# Patient Record
Sex: FEMALE | Race: WHITE | NOT HISPANIC OR LATINO | URBAN - METROPOLITAN AREA
[De-identification: names, ages, dates, MRNs, and addresses within clinical notes are randomized per-mention and may not be internally consistent; named-entity substitution may affect disease eponyms.]

---

## 2018-03-22 ENCOUNTER — INPATIENT (INPATIENT)
Facility: HOSPITAL | Age: 38
LOS: 0 days | Discharge: HOME | DRG: 419 | End: 2018-03-23
Attending: SURGERY | Admitting: SURGERY
Payer: COMMERCIAL

## 2018-03-22 VITALS
HEART RATE: 72 BPM | RESPIRATION RATE: 18 BRPM | OXYGEN SATURATION: 99 % | TEMPERATURE: 98 F | SYSTOLIC BLOOD PRESSURE: 105 MMHG | DIASTOLIC BLOOD PRESSURE: 73 MMHG | WEIGHT: 184.97 LBS

## 2018-03-22 DIAGNOSIS — Z98.891 HISTORY OF UTERINE SCAR FROM PREVIOUS SURGERY: Chronic | ICD-10-CM

## 2018-03-22 LAB
ALBUMIN SERPL ELPH-MCNC: 4.5 G/DL — SIGNIFICANT CHANGE UP (ref 3.3–5)
ALP SERPL-CCNC: 242 U/L — HIGH (ref 40–120)
ALT FLD-CCNC: 198 U/L — HIGH (ref 10–45)
ANION GAP SERPL CALC-SCNC: 13 MMOL/L — SIGNIFICANT CHANGE UP (ref 5–17)
APPEARANCE UR: (no result)
AST SERPL-CCNC: 137 U/L — HIGH (ref 10–40)
BACTERIA # UR AUTO: (no result) /HPF
BASOPHILS NFR BLD AUTO: 0.2 % — SIGNIFICANT CHANGE UP (ref 0–2)
BILIRUB SERPL-MCNC: 0.7 MG/DL — SIGNIFICANT CHANGE UP (ref 0.2–1.2)
BILIRUB UR-MCNC: NEGATIVE — SIGNIFICANT CHANGE UP
BUN SERPL-MCNC: 8 MG/DL — SIGNIFICANT CHANGE UP (ref 7–23)
CALCIUM SERPL-MCNC: 9.2 MG/DL — SIGNIFICANT CHANGE UP (ref 8.4–10.5)
CHLORIDE SERPL-SCNC: 98 MMOL/L — SIGNIFICANT CHANGE UP (ref 96–108)
CO2 SERPL-SCNC: 26 MMOL/L — SIGNIFICANT CHANGE UP (ref 22–31)
COLOR SPEC: YELLOW — SIGNIFICANT CHANGE UP
COMMENT - URINE: SIGNIFICANT CHANGE UP
CREAT SERPL-MCNC: 0.83 MG/DL — SIGNIFICANT CHANGE UP (ref 0.5–1.3)
DIFF PNL FLD: (no result)
EOSINOPHIL NFR BLD AUTO: 2.3 % — SIGNIFICANT CHANGE UP (ref 0–6)
EPI CELLS # UR: (no result) /HPF (ref 0–5)
GLUCOSE SERPL-MCNC: 79 MG/DL — SIGNIFICANT CHANGE UP (ref 70–99)
GLUCOSE UR QL: NEGATIVE — SIGNIFICANT CHANGE UP
HCG SERPL-ACNC: <.1 MIU/ML — SIGNIFICANT CHANGE UP
HCT VFR BLD CALC: 41.5 % — SIGNIFICANT CHANGE UP (ref 34.5–45)
HGB BLD-MCNC: 13.3 G/DL — SIGNIFICANT CHANGE UP (ref 11.5–15.5)
KETONES UR-MCNC: 15 MG/DL
LEUKOCYTE ESTERASE UR-ACNC: (no result)
LIDOCAIN IGE QN: 469 U/L — HIGH (ref 7–60)
LYMPHOCYTES # BLD AUTO: 41.5 % — SIGNIFICANT CHANGE UP (ref 13–44)
MCHC RBC-ENTMCNC: 27.9 PG — SIGNIFICANT CHANGE UP (ref 27–34)
MCHC RBC-ENTMCNC: 32 G/DL — SIGNIFICANT CHANGE UP (ref 32–36)
MCV RBC AUTO: 87 FL — SIGNIFICANT CHANGE UP (ref 80–100)
MONOCYTES NFR BLD AUTO: 3.6 % — SIGNIFICANT CHANGE UP (ref 2–14)
NEUTROPHILS NFR BLD AUTO: 52.4 % — SIGNIFICANT CHANGE UP (ref 43–77)
NITRITE UR-MCNC: POSITIVE
PH UR: 6 — SIGNIFICANT CHANGE UP (ref 5–8)
PLATELET # BLD AUTO: 266 K/UL — SIGNIFICANT CHANGE UP (ref 150–400)
POTASSIUM SERPL-MCNC: 3.7 MMOL/L — SIGNIFICANT CHANGE UP (ref 3.5–5.3)
POTASSIUM SERPL-SCNC: 3.7 MMOL/L — SIGNIFICANT CHANGE UP (ref 3.5–5.3)
PROT SERPL-MCNC: 8.1 G/DL — SIGNIFICANT CHANGE UP (ref 6–8.3)
PROT UR-MCNC: NEGATIVE MG/DL — SIGNIFICANT CHANGE UP
RBC # BLD: 4.77 M/UL — SIGNIFICANT CHANGE UP (ref 3.8–5.2)
RBC # FLD: 13 % — SIGNIFICANT CHANGE UP (ref 10.3–16.9)
RBC CASTS # UR COMP ASSIST: < 5 /HPF — SIGNIFICANT CHANGE UP
SODIUM SERPL-SCNC: 137 MMOL/L — SIGNIFICANT CHANGE UP (ref 135–145)
SP GR SPEC: 1.01 — SIGNIFICANT CHANGE UP (ref 1–1.03)
UROBILINOGEN FLD QL: 0.2 E.U./DL — SIGNIFICANT CHANGE UP
WBC # BLD: 6 K/UL — SIGNIFICANT CHANGE UP (ref 3.8–10.5)
WBC # FLD AUTO: 6 K/UL — SIGNIFICANT CHANGE UP (ref 3.8–10.5)
WBC UR QL: (no result) /HPF

## 2018-03-22 PROCEDURE — 99285 EMERGENCY DEPT VISIT HI MDM: CPT

## 2018-03-22 PROCEDURE — 71045 X-RAY EXAM CHEST 1 VIEW: CPT | Mod: 26

## 2018-03-22 PROCEDURE — 76700 US EXAM ABDOM COMPLETE: CPT | Mod: 26

## 2018-03-22 RX ORDER — ONDANSETRON 8 MG/1
4 TABLET, FILM COATED ORAL EVERY 6 HOURS
Qty: 0 | Refills: 0 | Status: DISCONTINUED | OUTPATIENT
Start: 2018-03-22 | End: 2018-03-22

## 2018-03-22 RX ORDER — SODIUM CHLORIDE 9 MG/ML
1000 INJECTION, SOLUTION INTRAVENOUS
Qty: 0 | Refills: 0 | Status: DISCONTINUED | OUTPATIENT
Start: 2018-03-22 | End: 2018-03-23

## 2018-03-22 RX ORDER — HYDROMORPHONE HYDROCHLORIDE 2 MG/ML
0.5 INJECTION INTRAMUSCULAR; INTRAVENOUS; SUBCUTANEOUS EVERY 4 HOURS
Qty: 0 | Refills: 0 | Status: DISCONTINUED | OUTPATIENT
Start: 2018-03-22 | End: 2018-03-23

## 2018-03-22 RX ORDER — ONDANSETRON 8 MG/1
4 TABLET, FILM COATED ORAL EVERY 6 HOURS
Qty: 0 | Refills: 0 | Status: DISCONTINUED | OUTPATIENT
Start: 2018-03-22 | End: 2018-03-23

## 2018-03-22 RX ORDER — HEPARIN SODIUM 5000 [USP'U]/ML
5000 INJECTION INTRAVENOUS; SUBCUTANEOUS EVERY 8 HOURS
Qty: 0 | Refills: 0 | Status: DISCONTINUED | OUTPATIENT
Start: 2018-03-22 | End: 2018-03-23

## 2018-03-22 RX ORDER — CEFOXITIN 1 G/1
2 INJECTION, POWDER, FOR SOLUTION INTRAVENOUS EVERY 6 HOURS
Qty: 0 | Refills: 0 | Status: DISCONTINUED | OUTPATIENT
Start: 2018-03-23 | End: 2018-03-23

## 2018-03-22 RX ORDER — CEFOXITIN 1 G/1
2 INJECTION, POWDER, FOR SOLUTION INTRAVENOUS ONCE
Qty: 0 | Refills: 0 | Status: COMPLETED | OUTPATIENT
Start: 2018-03-22 | End: 2018-03-22

## 2018-03-22 RX ORDER — NORGESTIMATE AND ETHINYL ESTRADIOL 7DAYSX3 LO
1 KIT ORAL DAILY
Qty: 0 | Refills: 0 | Status: DISCONTINUED | OUTPATIENT
Start: 2018-03-22 | End: 2018-03-23

## 2018-03-22 RX ORDER — CEFOXITIN 1 G/1
INJECTION, POWDER, FOR SOLUTION INTRAVENOUS
Qty: 0 | Refills: 0 | Status: DISCONTINUED | OUTPATIENT
Start: 2018-03-22 | End: 2018-03-23

## 2018-03-22 RX ADMIN — CEFOXITIN 100 GRAM(S): 1 INJECTION, POWDER, FOR SOLUTION INTRAVENOUS at 19:17

## 2018-03-22 RX ADMIN — HEPARIN SODIUM 5000 UNIT(S): 5000 INJECTION INTRAVENOUS; SUBCUTANEOUS at 21:48

## 2018-03-22 RX ADMIN — SODIUM CHLORIDE 100 MILLILITER(S): 9 INJECTION, SOLUTION INTRAVENOUS at 19:22

## 2018-03-22 NOTE — H&P ADULT - NSHPLABSRESULTS_GEN_ALL_CORE
13.3   6.0   )-----------( 266      ( 22 Mar 2018 13:13 )             41.5   03-22    137  |  98  |  8   ----------------------------<  79  3.7   |  26  |  0.83    Ca    9.2      22 Mar 2018 13:13    TPro  8.1  /  Alb  4.5  /  TBili  0.7  /  DBili  x   /  AST  137<H>  /  ALT  198<H>  /  AlkPhos  242<H>  03-22        EXAM:  US ABDOMEN COMPLETE                          PROCEDURE DATE:  03/22/2018                     INTERPRETATION:  ABDOMINAL ULTRASOUND dated 3/22/2018 4:10 PM    INDICATION: Nausea. Upper abdominal pain and vomiting for five days.    PRIOR STUDIES: 0.5    FINDINGS:     Liver: Normal echogenicity. Mildly enlarged. No focal lesions.    Intrahepatic ducts: Not dilated.    Common bile duct: Normal diameter, measuring 0.5 cm.    Gallbladder: The gallbladder is filled with stones. No gallbladder wall   thickening is noted. The patient was not tender over the gallbladder.    Pancreas: The visualized portions are normal in appearance.      Spleen: No focal abnormality seen. Length of 12.4 cm.    Abdominal aorta: No abdominal aortic aneurysm is seen.    Inferior vena cava: The visualized portions are normal in appearance.    Right kidney: No hydronephrosis. Normal echogenicity. No focal lesions.   Length of 10.6 cm.    Left kidney: No hydronephrosis. Normal echogenicity. No focal lesions.   Length of 10.4 cm.    Ascites: None.    Also noted: No other abnormalities are noted.      IMPRESSION:  1.  Cholelithiasis. No gallbladder wall thickening. Negative sonographic   Solano sign.  2.  Mild hepatomegaly.              "Thank you for the opportunity to participate in the care of this   patient."        ODALYS DUMONT M.D., ATTENDING RADIOLOGIST  This document has been electronically signed. Mar 22 2018  4:28PM

## 2018-03-22 NOTE — ED PROVIDER NOTE - ATTENDING CONTRIBUTION TO CARE
37 F hx GERD p/w 5 days intermittent epigastric pain, worse with food, intermittent nausea vomiting.  Currently w/o pain. Nontender.  Labs with elevated LFTs as outpt.  DDx include gallstone pancreatitis, gallstones, choledocholithiasis.  Plan labs, us, and surgery consult.

## 2018-03-22 NOTE — ED ADULT NURSE NOTE - OBJECTIVE STATEMENT
c.o epigastric pain on saturday. hx of gerd. states her "liver enzymes were elevated yesterday and was sent by pmd". denies any pain, nausea vomiting at this time. denies any other medical complaints

## 2018-03-22 NOTE — ED PROVIDER NOTE - OBJECTIVE STATEMENT
36 yo F with pmh of GERD c/o intermittent epigastric pain x 5 days. Pt states pain is burning/pressure and worse after she eats. Occasional nausea and vomiting. Pt reports similar pain on and off for a few years and was told it was GERD. Pt has altered her diet and avoids spicy/greasy foods. Pt saw her pmd yesterday and her LFTs were elevated so she was sent to the ED. Currently pt does not have pain. Denies fever, chills, cp, sob, dysuria, hematuria. Denies etoh use or NSAID use. Pt states she did eat a tablespoon of spicy food on sat prior to the pain starting.

## 2018-03-22 NOTE — H&P ADULT - NSHPPHYSICALEXAM_GEN_ALL_CORE
Neuro: AOX3, calm, NAD.   Gen: WD, WN, appropriately groomed.    HEENT: AT, NC   Resp: CTAB, no WWR  CV: Normal HS, RRR, no MRG  Abd: soft, ND, NT,no guarding or rebound, Negative Solano's, no masses or organomegaly appreciated  Musculoskeletal: sensation grossly intact, strength 5/5, FROM bilaterally   Pulses:                     R:      L:   Radial:    2+ / 2+  Femoral: 2+ / 2+  D. Pedis 2+ / 2+  Ankle Edema: No

## 2018-03-22 NOTE — H&P ADULT - ASSESSMENT
37y female with pancreatitis secondary to cholelithiasis, no evidence of cholecystitis.     - Admit to General Surgery   - OR tomorrow for laparoscopic cholecystectomy   - NPO/IVF  - IV Abx  - pain, nausea control   - DVT ppx: SCDs, SQH  - trend lipase, CMP daily

## 2018-03-22 NOTE — H&P ADULT - HISTORY OF PRESENT ILLNESS
36 yo F with PMH GERD c/o intermittent epigastric pain x 5 days. Pt states pain is burning/pressure and worse after she eats. Occasional nausea and vomiting. Pt reports similar pain on and off for a few years and was told it was GERD. Pt has altered her diet and avoids spicy/greasy foods.   Pt saw PMD yesterday and her LFTs were elevated and was subsequently sent to the ED.     In the ED patient does not currently have abdominal pain or GERD symptoms, +nausea, 2 episodes of NBNB emesis associated with chills. Patient is currently afebrile, HD stable.   RUQ US performed in the ED shows cholelithiasis with no cholecystitis.   Tbili normal - ASL/ALT, ALK phos and lipase elevated.     ICU Vital Signs Last 24 Hrs  T(C): 36.4 (22 Mar 2018 18:35), Max: 36.7 (22 Mar 2018 13:59)  T(F): 97.6 (22 Mar 2018 18:35), Max: 98.1 (22 Mar 2018 13:59)  HR: 69 (22 Mar 2018 18:35) (60 - 72)  BP: 113/68 (22 Mar 2018 18:35) (105/73 - 113/68)  RR: 18 (22 Mar 2018 18:35) (18 - 18)  SpO2: 97% (22 Mar 2018 18:35) (97% - 99%)

## 2018-03-23 VITALS
OXYGEN SATURATION: 98 % | DIASTOLIC BLOOD PRESSURE: 73 MMHG | TEMPERATURE: 98 F | RESPIRATION RATE: 17 BRPM | SYSTOLIC BLOOD PRESSURE: 111 MMHG | HEART RATE: 62 BPM

## 2018-03-23 RX ORDER — OXYCODONE AND ACETAMINOPHEN 5; 325 MG/1; MG/1
2 TABLET ORAL EVERY 4 HOURS
Qty: 0 | Refills: 0 | Status: DISCONTINUED | OUTPATIENT
Start: 2018-03-23 | End: 2018-03-23

## 2018-03-23 RX ORDER — OXYCODONE AND ACETAMINOPHEN 5; 325 MG/1; MG/1
1 TABLET ORAL EVERY 4 HOURS
Qty: 0 | Refills: 0 | Status: DISCONTINUED | OUTPATIENT
Start: 2018-03-23 | End: 2018-03-23

## 2018-03-23 RX ORDER — HYDROMORPHONE HYDROCHLORIDE 2 MG/ML
0.5 INJECTION INTRAMUSCULAR; INTRAVENOUS; SUBCUTANEOUS
Qty: 0 | Refills: 0 | Status: DISCONTINUED | OUTPATIENT
Start: 2018-03-23 | End: 2018-03-23

## 2018-03-23 RX ORDER — NORGESTIMATE AND ETHINYL ESTRADIOL 7DAYSX3 LO
1 KIT ORAL
Qty: 0 | Refills: 0 | COMMUNITY

## 2018-03-23 RX ORDER — SODIUM CHLORIDE 9 MG/ML
1000 INJECTION, SOLUTION INTRAVENOUS
Qty: 0 | Refills: 0 | Status: DISCONTINUED | OUTPATIENT
Start: 2018-03-23 | End: 2018-03-23

## 2018-03-23 RX ORDER — ONDANSETRON 8 MG/1
4 TABLET, FILM COATED ORAL EVERY 4 HOURS
Qty: 0 | Refills: 0 | Status: DISCONTINUED | OUTPATIENT
Start: 2018-03-23 | End: 2018-03-23

## 2018-03-23 RX ADMIN — HEPARIN SODIUM 5000 UNIT(S): 5000 INJECTION INTRAVENOUS; SUBCUTANEOUS at 05:30

## 2018-03-23 RX ADMIN — CEFOXITIN 100 GRAM(S): 1 INJECTION, POWDER, FOR SOLUTION INTRAVENOUS at 00:00

## 2018-03-23 RX ADMIN — CEFOXITIN 100 GRAM(S): 1 INJECTION, POWDER, FOR SOLUTION INTRAVENOUS at 05:30

## 2018-03-23 NOTE — DISCHARGE NOTE ADULT - CARE PLAN
Principal Discharge DX:	Gallstones  Goal:	Recovery  Assessment and plan of treatment:	Please follow up with Dr. Ugarte next week. Call his office to schedule an appointment: [phone].     -Continue eating your regular diet as tolerated and drinking plenty of fluids.   -For pain, you may take over-the-counter Tylenol as labeled. For breakthrough pain you may take Percocet, which contains Tylenol. Do NOT exceed 4000mg acetaminophen/Tylenol total within 24 hours. Do NOT take Advil, Motrin, or NSAIDs. Please take Colace 1 tab twice daily while taking narcotic pain medication to avoid constipation.  -No heavy lifting >20 pounds or strenuous exercise.  -You may remove your bandages 48 hours after surgery. Please keep wounds clean and dry.   -You may shower but NO baths and NO swimming. Keep your incisions clean & dry. Avoid a direct stream of water over incision sites. Do not scrub. Pat dry when done.  -Do not remove any Steri-strips; they will fall off on their own after a few showers.       Contact your doctor or go to the ER for fever > 101.5, chills, nausea, vomiting, chest pain, shortness of breath, pain not controlled by medication or excessive bleeding.

## 2018-03-23 NOTE — BRIEF OPERATIVE NOTE - OPERATION/FINDINGS
Multiple stones in gallbladder. Critical view of safety achieved, cystic artery and cystic duct ligated with metallic clips.

## 2018-03-23 NOTE — DISCHARGE NOTE ADULT - PATIENT PORTAL LINK FT
You can access the [facility], offered by [facility], by registering with the following website: [email or web address]

## 2018-03-23 NOTE — DISCHARGE NOTE ADULT - LAUNCH MEDICATION RECONCILIATION
Please refer to ultrasound report under 'Imaging' Studies of 'Chart Review' tabs.    Fernando Chen M.D.     <<-----Click here for Discharge Medication Review

## 2018-03-23 NOTE — BRIEF OPERATIVE NOTE - PROCEDURE
<<-----Click on this checkbox to enter Procedure Laparoscopic cholecystectomy  03/23/2018    Active  BBASSIRITE

## 2018-03-23 NOTE — DISCHARGE NOTE ADULT - PLAN OF CARE
Recovery Please follow up with Dr. Ugarte next week. Call his office to schedule an appointment: [phone].     -Continue eating your regular diet as tolerated and drinking plenty of fluids.   -For pain, you may take over-the-counter Tylenol as labeled. For breakthrough pain you may take Percocet, which contains Tylenol. Do NOT exceed 4000mg acetaminophen/Tylenol total within 24 hours. Do NOT take Advil, Motrin, or NSAIDs. Please take Colace 1 tab twice daily while taking narcotic pain medication to avoid constipation.  -No heavy lifting >20 pounds or strenuous exercise.  -You may remove your bandages 48 hours after surgery. Please keep wounds clean and dry.   -You may shower but NO baths and NO swimming. Keep your incisions clean & dry. Avoid a direct stream of water over incision sites. Do not scrub. Pat dry when done.  -Do not remove any Steri-strips; they will fall off on their own after a few showers.       Contact your doctor or go to the ER for fever > 101.5, chills, nausea, vomiting, chest pain, shortness of breath, pain not controlled by medication or excessive bleeding.

## 2018-03-23 NOTE — DISCHARGE NOTE ADULT - MEDICATION SUMMARY - MEDICATIONS TO TAKE
I will START or STAY ON the medications listed below when I get home from the hospital:    Percocet 5/325 oral tablet  -- 1 tab(s) by mouth every 6 hours, As Needed  for severe pain MDD:4  -- Caution federal law prohibits the transfer of this drug to any person other  than the person for whom it was prescribed.  May cause drowsiness.  Alcohol may intensify this effect.  Use care when operating dangerous machinery.  This prescription cannot be refilled.  This product contains acetaminophen.  Do not use  with any other product containing acetaminophen to prevent possible liver damage.  Using more of this medication than prescribed may cause serious breathing problems.    -- Indication: For Post-operative Pain    Sprintec 0.25 mg-35 mcg oral tablet  -- 1 tab(s) by mouth once a day  -- Indication: For Home Med

## 2018-03-23 NOTE — DISCHARGE NOTE ADULT - HOSPITAL COURSE
38 yo F, who presented to ED with 5 days of severe epigastric pain radiating to the RUQ. Underwent an U/S which showed concern for cholelithiasis, with a normal CBD measuring 0.5 cm. Patient was admitted to General Surgery for further management. Pt was made NPO and received IV antibiotics, IV fluids and adequate pain medication. Remained afebrile and hemodynamically stable. Pt was taken to the OR for a laparoscopic cholecystectomy. Gallbladder was non-perforated and non-gangrenous. The operation was uncomplicated and post-operative course was uneventful. Pt is now tolerating a low fat diet without nausea/vomiting, ambulating without difficulty, and voiding spontaneously with bowel function. Pain is well controlled on oral pain medication. Pt has been deemed ready for discharge and will follow up with the surgeon.

## 2018-03-23 NOTE — PROGRESS NOTE ADULT - SUBJECTIVE AND OBJECTIVE BOX
O/N: Admitted for acute georges, UA shows UTI, culture for urine in AM, pt is pre-opped, OR tomorrow for lap georges O/N: Admitted for acute georges, UA shows UTI, culture for urine in AM, pt is pre-opped, OR tomorrow for lap georges     SUBJECTIVE: Patient seen and examined bedside by chief resident. OR today for lap-georges    cefOXitin  IVPB      cefOXitin  IVPB 2 Gram(s) IV Intermittent every 6 hours  heparin  Injectable 5000 Unit(s) SubCutaneous every 8 hours      Vital Signs Last 24 Hrs  T(C): 36.4 (23 Mar 2018 08:25), Max: 36.7 (22 Mar 2018 13:59)  T(F): 97.5 (23 Mar 2018 08:25), Max: 98.1 (22 Mar 2018 13:59)  HR: 59 (23 Mar 2018 08:25) (54 - 74)  BP: 112/70 (23 Mar 2018 08:25) (103/66 - 113/68)  BP(mean): --  RR: 16 (23 Mar 2018 08:25) (16 - 18)  SpO2: 96% (23 Mar 2018 08:25) (96% - 99%)  I&O's Detail    22 Mar 2018 07:01  -  23 Mar 2018 07:00  --------------------------------------------------------  IN:    lactated ringers.: 1000 mL    Solution: 100 mL  Total IN: 1100 mL    OUT:    Voided: 450 mL  Total OUT: 450 mL    Total NET: 650 mL          General: NAD, resting comfortably in bed  C/V: NSR  Pulm: Nonlabored breathing, no respiratory distress  Abd: soft, Mildly tender in RUQ.   Extrem: WWP, no edema, SCDs in place        LABS:                        13.3   6.0   )-----------( 266      ( 22 Mar 2018 13:13 )             41.5     -    137  |  98  |  8   ----------------------------<  79  3.7   |  26  |  0.83    Ca    9.2      22 Mar 2018 13:13    TPro  8.1  /  Alb  4.5  /  TBili  0.7  /  DBili  x   /  AST  137<H>  /  ALT  198<H>  /  AlkPhos  242<H>  03-22      Urinalysis Basic - ( 22 Mar 2018 21:21 )    Color: Yellow / Appearance: SL Cloudy / S.010 / pH: x  Gluc: x / Ketone: 15 mg/dL  / Bili: Negative / Urobili: 0.2 E.U./dL   Blood: x / Protein: NEGATIVE mg/dL / Nitrite: POSITIVE   Leuk Esterase: Moderate / RBC: < 5 /HPF / WBC Many /HPF   Sq Epi: x / Non Sq Epi: Moderate /HPF / Bacteria: Many /HPF        RADIOLOGY & ADDITIONAL STUDIES:

## 2018-03-24 LAB
CULTURE RESULTS: SIGNIFICANT CHANGE UP
SPECIMEN SOURCE: SIGNIFICANT CHANGE UP

## 2018-03-27 PROCEDURE — 83690 ASSAY OF LIPASE: CPT

## 2018-03-27 PROCEDURE — 76700 US EXAM ABDOM COMPLETE: CPT

## 2018-03-27 PROCEDURE — 80053 COMPREHEN METABOLIC PANEL: CPT

## 2018-03-27 PROCEDURE — 71045 X-RAY EXAM CHEST 1 VIEW: CPT

## 2018-03-27 PROCEDURE — 84702 CHORIONIC GONADOTROPIN TEST: CPT

## 2018-03-27 PROCEDURE — 87086 URINE CULTURE/COLONY COUNT: CPT

## 2018-03-27 PROCEDURE — 88304 TISSUE EXAM BY PATHOLOGIST: CPT

## 2018-03-27 PROCEDURE — 36415 COLL VENOUS BLD VENIPUNCTURE: CPT

## 2018-03-27 PROCEDURE — 81001 URINALYSIS AUTO W/SCOPE: CPT

## 2018-03-27 PROCEDURE — 99285 EMERGENCY DEPT VISIT HI MDM: CPT | Mod: 25

## 2018-03-27 PROCEDURE — 85025 COMPLETE CBC W/AUTO DIFF WBC: CPT

## 2018-03-28 DIAGNOSIS — K21.9 GASTRO-ESOPHAGEAL REFLUX DISEASE WITHOUT ESOPHAGITIS: ICD-10-CM

## 2018-03-28 DIAGNOSIS — K80.00 CALCULUS OF GALLBLADDER WITH ACUTE CHOLECYSTITIS WITHOUT OBSTRUCTION: ICD-10-CM

## 2018-03-28 LAB — SURGICAL PATHOLOGY STUDY: SIGNIFICANT CHANGE UP

## 2018-06-27 NOTE — PROGRESS NOTE ADULT - ASSESSMENT
38 yo F with PMH GERD c/o intermittent epigastric pain x 5 days. US shows: Cholelithiasis. No gallbladder wall thickening. Negative sonographic Solano sign.    Pain/nausea control  NPO/IVF  Cefoxitin  Treat UTI  Urine Culture in AM  Pre-op/Lap Lida in AM  OOBA/SCDs  AM labs no

## 2018-12-03 NOTE — PRE-OP CHECKLIST - HEART RATE (BEATS/MIN)
Telephone Encounter by Marilee Bahena MD at 11/20/18 05:57 PM     Author:  Marilee Bahena MD Service:  (none) Author Type:  Physician     Filed:  11/20/18 06:07 PM Encounter Date:  11/20/2018 Status:  Signed     :  Marilee Bahena MD (Physician)            Spoke with patient as a reply to her my chart message.    Patient reports that lately her anxiety has been really high.  She states that she is also struggling with agoraphobia and leaving the house and going to school has been really difficult.  She states that she is interested in restarting the medication as she feels that the anxiety is interfering with her daily activities.  The patient reports that she also was recently diagnosed with mono and states that has been feeling tired which is also contributing towards the anxiety and feelings of sadness.      Discussed results of the gene sight testing. Decided to start her on Pristiq 25 mg daily for anxiety.  We discussed that Pristiq is not specifically an antianxiety medication but does help with anxiety.  We also discussed starting Ativan 0.5 mg twice a day as needed for anxiety and Agoraphobia.    Risk, benefits, side effects and black box warning discussed.    Also discussed interaction between prednisone and Pristiq.  Patient states that she will be taking prednisone for another 3 days.     Inform patient that the testing shows reduced folic acid conversion and therefore she would benefit from folic acid supplementation.    Patient agrees and verbalizes understanding of the plan.[HA1.1M]          Revision History        User Key Date/Time User Provider Type Action    > HA1.1 11/20/18 06:07 PM Marilee Bahena MD Physician Sign    M - Manual            
61

## 2022-07-14 NOTE — BRIEF OPERATIVE NOTE - PRE-OP DX
Head,  normocephalic,  atraumatic,  Face,  Face within normal limits,  Ears,  External ears within normal limits,  Nose/Nasopharynx,  External nose  normal appearance, Mouth and Throat,  Breath odor normal,  Lips,  Appearance normal Cholecystitis with cholelithiasis  03/23/2018    Active  Michele Devine
